# Patient Record
Sex: MALE | Race: WHITE | NOT HISPANIC OR LATINO | Employment: FULL TIME | URBAN - METROPOLITAN AREA
[De-identification: names, ages, dates, MRNs, and addresses within clinical notes are randomized per-mention and may not be internally consistent; named-entity substitution may affect disease eponyms.]

---

## 2017-06-19 ENCOUNTER — APPOINTMENT (OUTPATIENT)
Dept: RADIOLOGY | Facility: CLINIC | Age: 47
End: 2017-06-19
Payer: COMMERCIAL

## 2017-06-19 ENCOUNTER — ALLSCRIPTS OFFICE VISIT (OUTPATIENT)
Dept: OTHER | Facility: OTHER | Age: 47
End: 2017-06-19

## 2017-06-19 DIAGNOSIS — M25.512 PAIN IN LEFT SHOULDER: ICD-10-CM

## 2017-06-19 DIAGNOSIS — M75.82 OTHER SHOULDER LESIONS, LEFT SHOULDER: ICD-10-CM

## 2017-06-19 PROCEDURE — 73030 X-RAY EXAM OF SHOULDER: CPT

## 2017-07-17 ENCOUNTER — HOSPITAL ENCOUNTER (EMERGENCY)
Facility: HOSPITAL | Age: 47
Discharge: HOME | End: 2017-07-17
Attending: EMERGENCY MEDICINE | Admitting: EMERGENCY MEDICINE
Payer: COMMERCIAL

## 2017-07-17 VITALS
TEMPERATURE: 99.8 F | RESPIRATION RATE: 20 BRPM | HEART RATE: 88 BPM | HEIGHT: 74 IN | DIASTOLIC BLOOD PRESSURE: 80 MMHG | WEIGHT: 200 LBS | OXYGEN SATURATION: 97 % | BODY MASS INDEX: 25.67 KG/M2 | SYSTOLIC BLOOD PRESSURE: 134 MMHG

## 2017-07-17 DIAGNOSIS — D72.829 LEUKOCYTOSIS: ICD-10-CM

## 2017-07-17 DIAGNOSIS — D64.9 ANEMIA: ICD-10-CM

## 2017-07-17 DIAGNOSIS — M25.50 ARTHRALGIA: Primary | ICD-10-CM

## 2017-07-17 LAB
ANION GAP SERPL CALCULATED.3IONS-SCNC: 8 MMOL/L (ref 4–13)
BASOPHILS # BLD AUTO: 0 THOUSANDS/ΜL (ref 0–0.1)
BASOPHILS NFR BLD AUTO: 0 % (ref 0–1)
BUN SERPL-MCNC: 19 MG/DL (ref 5–25)
CALCIUM SERPL-MCNC: 9.2 MG/DL (ref 8.3–10.1)
CHLORIDE SERPL-SCNC: 98 MMOL/L (ref 100–108)
CK SERPL-CCNC: 113 U/L (ref 39–308)
CO2 SERPL-SCNC: 28 MMOL/L (ref 21–32)
CREAT SERPL-MCNC: 1.36 MG/DL (ref 0.6–1.3)
EOSINOPHIL # BLD AUTO: 1.3 THOUSAND/ΜL (ref 0–0.61)
EOSINOPHIL NFR BLD AUTO: 7 % (ref 0–6)
ERYTHROCYTE [DISTWIDTH] IN BLOOD BY AUTOMATED COUNT: 14.2 % (ref 11.6–15.1)
GFR SERPL CREATININE-BSD FRML MDRD: 56.7 ML/MIN/1.73SQ M
GLUCOSE SERPL-MCNC: 106 MG/DL (ref 65–140)
HCT VFR BLD AUTO: 34.5 % (ref 42–52)
HGB BLD-MCNC: 11.1 G/DL (ref 14–18)
LYMPHOCYTES # BLD AUTO: 1.5 THOUSANDS/ΜL (ref 0.6–4.47)
LYMPHOCYTES NFR BLD AUTO: 8 % (ref 14–44)
MCH RBC QN AUTO: 26.2 PG (ref 27–31)
MCHC RBC AUTO-ENTMCNC: 32.2 G/DL (ref 31.4–37.4)
MCV RBC AUTO: 82 FL (ref 82–98)
MONOCYTES # BLD AUTO: 1.6 THOUSAND/ΜL (ref 0.17–1.22)
MONOCYTES NFR BLD AUTO: 9 % (ref 4–12)
NEUTROPHILS # BLD AUTO: 13.6 THOUSANDS/ΜL (ref 1.85–7.62)
NEUTS SEG NFR BLD AUTO: 76 % (ref 43–75)
NRBC BLD AUTO-RTO: 0 /100 WBCS
PLATELET # BLD AUTO: 493 THOUSANDS/UL (ref 130–400)
PMV BLD AUTO: 6.4 FL (ref 8.9–12.7)
POTASSIUM SERPL-SCNC: 4.4 MMOL/L (ref 3.5–5.3)
RBC # BLD AUTO: 4.23 MILLION/UL (ref 4.7–6.1)
SODIUM SERPL-SCNC: 134 MMOL/L (ref 136–145)
WBC # BLD AUTO: 18 THOUSAND/UL (ref 4.8–10.8)

## 2017-07-17 PROCEDURE — 99283 EMERGENCY DEPT VISIT LOW MDM: CPT

## 2017-07-17 PROCEDURE — 82550 ASSAY OF CK (CPK): CPT | Performed by: EMERGENCY MEDICINE

## 2017-07-17 PROCEDURE — 85025 COMPLETE CBC W/AUTO DIFF WBC: CPT | Performed by: EMERGENCY MEDICINE

## 2017-07-17 PROCEDURE — 96361 HYDRATE IV INFUSION ADD-ON: CPT

## 2017-07-17 PROCEDURE — 80048 BASIC METABOLIC PNL TOTAL CA: CPT | Performed by: EMERGENCY MEDICINE

## 2017-07-17 PROCEDURE — 96374 THER/PROPH/DIAG INJ IV PUSH: CPT

## 2017-07-17 PROCEDURE — 36415 COLL VENOUS BLD VENIPUNCTURE: CPT | Performed by: EMERGENCY MEDICINE

## 2017-07-17 RX ORDER — IBUPROFEN 600 MG/1
600 TABLET ORAL EVERY 8 HOURS PRN
COMMUNITY

## 2017-07-17 RX ORDER — KETOROLAC TROMETHAMINE 30 MG/ML
15 INJECTION, SOLUTION INTRAMUSCULAR; INTRAVENOUS ONCE
Status: COMPLETED | OUTPATIENT
Start: 2017-07-17 | End: 2017-07-17

## 2017-07-17 RX ORDER — OXYCODONE HYDROCHLORIDE AND ACETAMINOPHEN 5; 325 MG/1; MG/1
1 TABLET ORAL ONCE
Status: COMPLETED | OUTPATIENT
Start: 2017-07-17 | End: 2017-07-17

## 2017-07-17 RX ORDER — OXYCODONE HYDROCHLORIDE AND ACETAMINOPHEN 5; 325 MG/1; MG/1
1 TABLET ORAL EVERY 6 HOURS PRN
Qty: 15 TABLET | Refills: 0 | Status: SHIPPED | OUTPATIENT
Start: 2017-07-17

## 2017-07-17 RX ADMIN — KETOROLAC TROMETHAMINE 15 MG: 30 INJECTION, SOLUTION INTRAMUSCULAR at 15:00

## 2017-07-17 RX ADMIN — SODIUM CHLORIDE 1000 ML: 0.9 INJECTION, SOLUTION INTRAVENOUS at 21:41

## 2017-07-17 RX ADMIN — OXYCODONE HYDROCHLORIDE AND ACETAMINOPHEN 1 TABLET: 5; 325 TABLET ORAL at 22:37

## 2017-08-01 ENCOUNTER — ALLSCRIPTS OFFICE VISIT (OUTPATIENT)
Dept: OTHER | Facility: OTHER | Age: 47
End: 2017-08-01

## 2017-08-02 ENCOUNTER — APPOINTMENT (OUTPATIENT)
Dept: PHYSICAL THERAPY | Facility: CLINIC | Age: 47
End: 2017-08-02
Payer: COMMERCIAL

## 2017-08-02 PROCEDURE — 97162 PT EVAL MOD COMPLEX 30 MIN: CPT

## 2017-08-02 PROCEDURE — 97140 MANUAL THERAPY 1/> REGIONS: CPT

## 2017-08-04 ENCOUNTER — GENERIC CONVERSION - ENCOUNTER (OUTPATIENT)
Dept: OTHER | Facility: OTHER | Age: 47
End: 2017-08-04

## 2017-08-09 ENCOUNTER — APPOINTMENT (OUTPATIENT)
Dept: PHYSICAL THERAPY | Facility: CLINIC | Age: 47
End: 2017-08-09
Payer: COMMERCIAL

## 2017-08-11 ENCOUNTER — APPOINTMENT (OUTPATIENT)
Dept: PHYSICAL THERAPY | Facility: CLINIC | Age: 47
End: 2017-08-11
Payer: COMMERCIAL

## 2017-08-15 ENCOUNTER — APPOINTMENT (OUTPATIENT)
Dept: PHYSICAL THERAPY | Facility: CLINIC | Age: 47
End: 2017-08-15
Payer: COMMERCIAL

## 2017-08-17 ENCOUNTER — APPOINTMENT (OUTPATIENT)
Dept: PHYSICAL THERAPY | Facility: CLINIC | Age: 47
End: 2017-08-17
Payer: COMMERCIAL

## 2017-08-23 ENCOUNTER — APPOINTMENT (OUTPATIENT)
Dept: PHYSICAL THERAPY | Facility: CLINIC | Age: 47
End: 2017-08-23
Payer: COMMERCIAL

## 2017-08-25 ENCOUNTER — APPOINTMENT (OUTPATIENT)
Dept: PHYSICAL THERAPY | Facility: CLINIC | Age: 47
End: 2017-08-25
Payer: COMMERCIAL

## 2017-08-29 ENCOUNTER — APPOINTMENT (OUTPATIENT)
Dept: PHYSICAL THERAPY | Facility: CLINIC | Age: 47
End: 2017-08-29
Payer: COMMERCIAL

## 2017-08-31 ENCOUNTER — APPOINTMENT (OUTPATIENT)
Dept: PHYSICAL THERAPY | Facility: CLINIC | Age: 47
End: 2017-08-31
Payer: COMMERCIAL

## 2018-01-12 VITALS — WEIGHT: 203.13 LBS | BODY MASS INDEX: 26.07 KG/M2 | HEIGHT: 74 IN

## 2018-01-13 VITALS
BODY MASS INDEX: 27 KG/M2 | SYSTOLIC BLOOD PRESSURE: 116 MMHG | HEIGHT: 74 IN | DIASTOLIC BLOOD PRESSURE: 76 MMHG | WEIGHT: 210.38 LBS | HEART RATE: 54 BPM

## 2019-08-14 ENCOUNTER — APPOINTMENT (OUTPATIENT)
Dept: RADIOLOGY | Facility: CLINIC | Age: 49
End: 2019-08-14
Payer: COMMERCIAL

## 2019-08-14 VITALS
WEIGHT: 200 LBS | DIASTOLIC BLOOD PRESSURE: 99 MMHG | SYSTOLIC BLOOD PRESSURE: 141 MMHG | HEIGHT: 74 IN | BODY MASS INDEX: 25.67 KG/M2 | HEART RATE: 73 BPM

## 2019-08-14 DIAGNOSIS — M76.32 IT BAND SYNDROME, LEFT: ICD-10-CM

## 2019-08-14 DIAGNOSIS — M25.552 PAIN IN LEFT HIP: ICD-10-CM

## 2019-08-14 DIAGNOSIS — M70.62 TROCHANTERIC BURSITIS OF LEFT HIP: Primary | ICD-10-CM

## 2019-08-14 PROCEDURE — 73502 X-RAY EXAM HIP UNI 2-3 VIEWS: CPT

## 2019-08-14 PROCEDURE — 99213 OFFICE O/P EST LOW 20 MIN: CPT | Performed by: ORTHOPAEDIC SURGERY

## 2019-08-14 RX ORDER — ALUMINUM ZIRCONIUM OCTACHLOROHYDREX GLY 16 G/100G
GEL TOPICAL
COMMUNITY

## 2019-08-14 NOTE — PROGRESS NOTES
Assessment/Plan:  1  Trochanteric bursitis of left hip  XR hip/pelv 2-3 vws left if performed    Ambulatory referral to Physical Therapy   2  It band syndrome, left  Ambulatory referral to Physical Therapy       Ayaan Pedroza has left-sided hip pain consistent with trochanteric bursitis and IT band syndrome  His x-ray does not show any significant abnormality in the hip or lateral to the greater trochanter  I would like for him to begin with conservative treatment of physical therapy for IT band stretching  He should back down on his running slightly as he undergoes treatment  He could try cross training with biking or swimming  I will see him back in 6 weeks for repeat evaluation we could consider cortisone injection  I asked him to check with his rheumatologist before we proceed with a cortisone injection to make sure this is okay since he has been on long-term steroids in the past     Subjective:   Tania Laguna is a 53 y o  male who presents to the office for evaluation for left-sided hip pain  He states he has been experiencing increased discomfort over the lateral aspect of his left hip for the past several months  He denies any specific injury or trauma  He does report a long history of recent medical issues dating back to the last 2 years  He has diagnosis of Wegener's granulomatosis recently which he attributes to some of his muscular aches  He also states that he is an avid runner and runs anywhere from 5-6 miles every other day  The pain will increase after running  He feels most discomfort over the lateral portion of the hip and occasionally travels down the leg  He denies any pain into his groin  He has tried resting and ice but he cannot take anti-inflammatories  He has been trying to take Tylenol occasionally  Review of Systems   Constitutional: Negative for chills, fever and unexpected weight change  HENT: Negative for hearing loss, nosebleeds and sore throat      Eyes: Negative for pain, redness and visual disturbance  Respiratory: Negative for cough, shortness of breath and wheezing  Cardiovascular: Negative for chest pain, palpitations and leg swelling  Gastrointestinal: Negative for abdominal pain, nausea and vomiting  Endocrine: Negative for polyphagia and polyuria  Genitourinary: Negative for dysuria and hematuria  Musculoskeletal:        See HPI   Skin: Negative for rash and wound  Neurological: Negative for dizziness, numbness and headaches  Psychiatric/Behavioral: Negative for decreased concentration and suicidal ideas  The patient is not nervous/anxious            Past Medical History:   Diagnosis Date    Head injury     Renal cancer, left ([address] ) 2018    Wegener's granulomatosis (granulomatosis with polyangiitis) (HCC)        Past Surgical History:   Procedure Laterality Date    NEPHRECTOMY  2018    partial        Family History   Problem Relation Age of Onset    Cancer Mother     No Known Problems Father     No Known Problems Sister     No Known Problems Brother     No Known Problems Maternal Aunt     No Known Problems Maternal Uncle     No Known Problems Paternal Aunt     No Known Problems Paternal Uncle     No Known Problems Maternal Grandmother     No Known Problems Maternal Grandfather     No Known Problems Paternal Grandmother     No Known Problems Paternal Grandfather        Social History     Occupational History    Not on file   Tobacco Use    Smoking status: Never Smoker    Smokeless tobacco: Never Used   Substance and Sexual Activity    Alcohol use: Yes     Comment: social     Drug use: No    Sexual activity: Not on file         Current Outpatient Medications:     bifidobacterium infantis (ALIGN) capsule, Take by mouth, Disp: , Rfl:     riTUXimab (RITUXAN) injection, Infuse into a venous catheter, Disp: , Rfl:     ibuprofen (MOTRIN) 600 mg tablet, Take 600 mg by mouth every 8 (eight) hours as needed for mild pain, Disp: , Rfl:    oxyCODONE-acetaminophen (PERCOCET) 5-325 mg per tablet, Take 1 tablet by mouth every 6 (six) hours as needed for moderate pain for up to 15 doses Max Daily Amount: 4 tablets (Patient not taking: Reported on 8/14/2019), Disp: 15 tablet, Rfl: 0    Allergies   Allergen Reactions    Bactrim [Sulfamethoxazole-Trimethoprim] Hives       Objective:  Vitals:    08/14/19 0924   BP: 141/99   Pulse: 73       Left Hip Exam     Tenderness   The patient is experiencing tenderness in the greater trochanter  Range of Motion   Abduction: normal   Adduction: normal   Extension: normal   Flexion: normal   External rotation: normal   Internal rotation: normal     Muscle Strength   Abduction: 5/5   Adduction: 5/5   Flexion: 5/5     Tests   AYAN: negative  Regina: positive    Other   Erythema: absent  Sensation: normal  Pulse: present            Physical Exam   Constitutional: He is oriented to person, place, and time  He appears well-developed and well-nourished  HENT:   Head: Normocephalic and atraumatic  Eyes: Pupils are equal, round, and reactive to light  Conjunctivae are normal    Neck: Normal range of motion  Neck supple  Cardiovascular: Normal rate and intact distal pulses  Pulmonary/Chest: Effort normal  No respiratory distress  Musculoskeletal:   As noted in HPI   Neurological: He is alert and oriented to person, place, and time  Skin: Skin is warm and dry  Psychiatric: He has a normal mood and affect  His behavior is normal    Vitals reviewed  I have personally reviewed pertinent films in PACS and my interpretation is as follows: Three-view x-ray of the left hip today demonstrates a small osseous density just superior to the acetabulum at the level of pincer lesion  No evidence of acute fracture

## 2019-08-21 ENCOUNTER — EVALUATION (OUTPATIENT)
Dept: PHYSICAL THERAPY | Facility: CLINIC | Age: 49
End: 2019-08-21
Payer: COMMERCIAL

## 2019-08-21 DIAGNOSIS — M70.62 TROCHANTERIC BURSITIS OF LEFT HIP: ICD-10-CM

## 2019-08-21 DIAGNOSIS — M76.32 IT BAND SYNDROME, LEFT: ICD-10-CM

## 2019-08-21 PROCEDURE — 97161 PT EVAL LOW COMPLEX 20 MIN: CPT

## 2019-08-21 NOTE — PROGRESS NOTES
PT Evaluation     Today's date: 2019  Patient name: Karishma Antunez  : 1970  MRN: [id number]  Referring provider: Erika Chavez DO  Dx:   Encounter Diagnosis     ICD-10-CM    1  Trochanteric bursitis of left hip M70 62 Ambulatory referral to Physical Therapy   2  It band syndrome, left M76 32 Ambulatory referral to Physical Therapy       Start Time: 1400  Stop Time: 1430  Total time in clinic (min): 30 minutes    Assessment  Assessment details: Karishma Antunez is a 53 y o  male who presents with pain, decreased strength, decreased ROM, decreased joint mobility and postural  dysfunction  Due to these impairments, patient has difficulty performing a/iadls, recreational activities and work-related activities  Patient's clinical presentation is consistent with their referring diagnosis of No diagnosis found    Patient has been educated in home exercise program and plan of care  Patient would benefit from skilled physical therapy services to address their aforementioned functional limitations and progress towards prior level of function and independence with home exercise program    Impairments: abnormal muscle firing, abnormal or restricted ROM, activity intolerance, impaired physical strength, lacks appropriate home exercise program, pain with function and poor posture   Understanding of Dx/Px/POC: good   Prognosis: good    Goals  Short Term Goals: Target Date 19  1  Pt will initiate and advance HEP  2  Pt will demonstrate increased HS length  3  Pt will demonstrate improved standing posture  4  Pt will report decreased pain frequency  Long Term Goals: Target Date 10/21/19  1  Pt will demonstrate independence in HEP  2  Pt will demonstrate symmetrical AROM in b/l hips  3  Pt will report return to running without pain  4  Pt will demonstrate improved squat mechanics         Plan  Patient would benefit from: skilled PT  Planned modality interventions: cryotherapy, electrical stimulation/[place]rainian stimulation and thermotherapy: hydrocollator packs  Planned therapy interventions: joint mobilization, manual therapy, patient education, postural training, activity modification, abdominal trunk stabilization, body mechanics training, flexibility, functional ROM exercises, graded exercise, home exercise program, neuromuscular re-education, strengthening, stretching, therapeutic activities, therapeutic exercise, motor coordination training, muscle pump exercises, gait training, balance/weight bearing training, ADL training and breathing training  Frequency: 2x week  Duration in weeks: 8  Treatment plan discussed with: patient        Subjective Evaluation    History of Present Illness  Date of onset: 2019  Mechanism of injury: Pt reports onset of L hip pain about a month and a half ago  Pt reports he attributes to him increasing mileage with running  Pt reports he had also just d/c taking pregnazone for 2 years secondary to diagnosis of Wegener's granulomatosis  Pt reports pain in L ship is constant and increases with running and stress  Pt sought Dr Gato Walden, radiographs (-) and was referred to [facility]  Not a recurrent problem   Quality of life: good    Pain  Current pain ratin  At best pain ratin  At worst pain ratin  Location: L lateral hip to L lateral knee  Quality: radiating  Relieving factors: rest and change in position  Aggravating factors: running, walking, standing and stair climbing  Progression: no change    Social Support    Employment status: working (poice officer)  Exercise history: running 3x/week, [employer], machines, body weight exercises      Diagnostic Tests  X-ray: normal  Patient Goals  Patient goals for therapy: return to sport/leisure activities and decreased pain          Objective     Static Posture     Comments  Pt stands with L lateral shift, FHP, b/l IR shoulders    Palpation   Left   Hypertonic in the lumbar paraspinals and quadratus lumborum     Tenderness of the gluteus medius, obturator externus, piriformis and TFL  Tenderness     Left Hip   Tenderness in the greater trochanter and sacroiliac joint  Lumbar Screen  Lumbar range of motion within normal limits with the following exceptions:L/S  Flexion decreased by 25%  Extension WFLs  Side Bending decreased by 25% R*pain   Rotaiton WFsLs *pain R     Neurological Testing     Sensation     Hip   Left Hip   Intact: light touch    Right Hip   Intact: light touch    Active Range of Motion   Left Hip   Flexion: 122 degrees   External rotation (90/90): 20 degrees   Internal rotation (90/90): 30 degrees     Right Hip   Flexion: 126 degrees   External rotation (90/90): 20 degrees   Internal rotation (90/90): 30 degrees     Strength/Myotome Testing     Left Hip   Planes of Motion   Flexion: 5  Extension: 4+  Abduction: 4+  External rotation: 4- (pain)  Internal rotation: 4    Right Hip   Normal muscle strength    Tests     Left Hip   Positive Ely's and scour  Negative AYAN    90/90 SLR: Positive  Right Hip   Negative AYAN    90/90 SLR: Positive       Functional Assessment        Single Leg Stance   Left: 30 seconds  Right: 30 seconds    Comments  Squat b/l knee valgus increased trunk flexion *pain              Precautions: standard, CA      Manual  8/21                                                                                 Exercise Diary              bike             Piriformis st 3x:30            HS stretch 3x:30            Hip flexor stretch 3x:30                                                                                                                                                                                                                                Modalities

## 2019-08-26 ENCOUNTER — OFFICE VISIT (OUTPATIENT)
Dept: PHYSICAL THERAPY | Facility: CLINIC | Age: 49
End: 2019-08-26
Payer: COMMERCIAL

## 2019-08-26 DIAGNOSIS — M76.32 IT BAND SYNDROME, LEFT: ICD-10-CM

## 2019-08-26 DIAGNOSIS — M70.62 TROCHANTERIC BURSITIS OF LEFT HIP: Primary | ICD-10-CM

## 2019-08-26 PROCEDURE — 97110 THERAPEUTIC EXERCISES: CPT

## 2019-08-26 PROCEDURE — 97140 MANUAL THERAPY 1/> REGIONS: CPT

## 2019-08-26 NOTE — PROGRESS NOTES
Daily Note     Today's date: 2019  Patient name: Ana Rodriguez  : 1970  MRN: [id number]  Referring provider: Yaritza Bennett DO  Dx:   Encounter Diagnosis     ICD-10-CM    1  Trochanteric bursitis of left hip M70 62    2  It band syndrome, left M76 32                   Subjective: Pt reports he performed stretches which helped discomfort, reports he continues to run, painful during and afterwards  Objective: See treatment diary below    Manual             IAS  L LE                                                                   Exercise Diary              bike             Piriformis st 3x:30 3x:30           HS stretch 3x:30 3x:30           Hip flexor stretch 3x:30 3x:30           Clamshells  20x           Hip IR  20x           Hip Abd  20x           Gastroc Stretch  1 min           Pallof Press  5x blue                                                                                                                                                              Modalities                                                         Assessment: Pt notes ttp along greater trochanter, demonstrates weakness on L with pallof press  Plan: Continue per plan of care  Progress treatment as tolerated         Precautions: standard, CA

## 2019-08-28 ENCOUNTER — OFFICE VISIT (OUTPATIENT)
Dept: PHYSICAL THERAPY | Facility: CLINIC | Age: 49
End: 2019-08-28
Payer: COMMERCIAL

## 2019-08-28 DIAGNOSIS — M70.62 TROCHANTERIC BURSITIS OF LEFT HIP: Primary | ICD-10-CM

## 2019-08-28 DIAGNOSIS — M76.32 IT BAND SYNDROME, LEFT: ICD-10-CM

## 2019-08-28 PROCEDURE — 97110 THERAPEUTIC EXERCISES: CPT

## 2019-08-28 PROCEDURE — 97140 MANUAL THERAPY 1/> REGIONS: CPT

## 2019-08-28 NOTE — PROGRESS NOTES
Daily Note     Today's date: 2019  Patient name: Eugenia Andrade  : 1970  MRN: [id number]  Referring provider: Agustin Lizarraga DO  Dx:   Encounter Diagnosis     ICD-10-CM    1  Trochanteric bursitis of left hip M70 62    2  It band syndrome, left M76 32        Start Time: 1345  Stop Time: 1430  Total time in clinic (min): 45 minutes    Subjective: Pt reports he tried cross training hip pain remains with stairs and elliptical, but less  Objective: See treatment diary below    Manual            IASTM  L LE L LE                                                                  Exercise Diary              bike             Piriformis st 3x:30 3x:30 3x:30          HS stretch 3x:30 3x:30 3x:30          Hip flexor stretch 3x:30 3x:30 3x:30          Clamshells  20x 30x          Hip IR  20x 30x          Hip Abd  20x 30x          Gastroc Stretch  1 min 1 min          Pallof Press  5x blue 5x                                                                                                                                                             Modalities                                                         Assessment: Pt reports overall less discomfort with pallof press, ttp remains along GT  Plan: Continue per plan of care  Progress treatment as tolerated         Precautions: standard, CA Yes

## 2019-09-04 ENCOUNTER — OFFICE VISIT (OUTPATIENT)
Dept: PHYSICAL THERAPY | Facility: CLINIC | Age: 49
End: 2019-09-04
Payer: COMMERCIAL

## 2019-09-04 DIAGNOSIS — M70.62 TROCHANTERIC BURSITIS OF LEFT HIP: Primary | ICD-10-CM

## 2019-09-04 DIAGNOSIS — M76.32 IT BAND SYNDROME, LEFT: ICD-10-CM

## 2019-09-04 PROCEDURE — 97110 THERAPEUTIC EXERCISES: CPT

## 2019-09-04 PROCEDURE — 97140 MANUAL THERAPY 1/> REGIONS: CPT

## 2019-09-04 NOTE — PROGRESS NOTES
Daily Note     Today's date: 2019  Patient name: Mayda Hawley  : 1970  MRN: [id number]  Referring provider: Max Regan DO  Dx:   Encounter Diagnosis     ICD-10-CM    1  Trochanteric bursitis of left hip M70 62    2  It band syndrome, left M76 32        Start Time: 1345  Stop Time: 1430  Total time in clinic (min): 45 minutes    Subjective: Pt reports he can tell L hip is improving, less frequent pain and more mobility  2/10 pain in L hip  Objective: See treatment diary below    Manual           IASTM  L LE L LE L LE                                                                 Exercise Diary              bike             Piriformis st 3x:30 3x:30 3x:30 3x:30         HS stretch 3x:30 3x:30 3x:30 3x:30         Hip flexor stretch 3x:30 3x:30 3x:30 3x:30         Clamshells  20x 30x          Hip IR  20x 30x          Hip Abd  20x 30x          Gastroc Stretch  1 min 1 min          Pallof Press  5x blue 5x 5x         Lateral Stepping    20x red         3 way hip    kieser 3 10x                                                                                                                                  Modalities                                                         Assessment: Pt reports less pain post manual, no change in HEP  Plan: Continue per plan of care  Progress treatment as tolerated         Precautions: standard, CA

## 2019-09-09 ENCOUNTER — APPOINTMENT (OUTPATIENT)
Dept: PHYSICAL THERAPY | Facility: CLINIC | Age: 49
End: 2019-09-09
Payer: COMMERCIAL

## 2019-09-09 NOTE — PROGRESS NOTES
Daily Note     Today's date: 2019  Patient name: Kirby Briggs  : 1970  MRN: [id number]  Referring provider: Selene Lindsey DO  Dx:   No diagnosis found  Subjective: Pt reports he can tell L hip is improving, less frequent pain and more mobility  2/10 pain in L hip  Objective: See treatment diary below    Manual           IASTM  L LE L LE L LE                                                                 Exercise Diary              bike             Piriformis st 3x:30 3x:30 3x:30 3x:30         HS stretch 3x:30 3x:30 3x:30 3x:30         Hip flexor stretch 3x:30 3x:30 3x:30 3x:30         Clamshells  20x 30x          Hip IR  20x 30x          Hip Abd  20x 30x          Gastroc Stretch  1 min 1 min          Pallof Press  5x blue 5x 5x         Lateral Stepping    20x red         3 way hip    kieser 3 10x                                                                                                                                  Modalities                                                         Assessment: Pt reports less pain post manual, no change in HEP  Plan: Continue per plan of care  Progress treatment as tolerated         Precautions: standard, CA

## 2019-09-11 ENCOUNTER — OFFICE VISIT (OUTPATIENT)
Dept: PHYSICAL THERAPY | Facility: CLINIC | Age: 49
End: 2019-09-11
Payer: COMMERCIAL

## 2019-09-11 DIAGNOSIS — M76.32 IT BAND SYNDROME, LEFT: ICD-10-CM

## 2019-09-11 DIAGNOSIS — M70.62 TROCHANTERIC BURSITIS OF LEFT HIP: Primary | ICD-10-CM

## 2019-09-11 PROCEDURE — 97110 THERAPEUTIC EXERCISES: CPT

## 2019-09-11 PROCEDURE — 97140 MANUAL THERAPY 1/> REGIONS: CPT

## 2019-09-11 NOTE — PROGRESS NOTES
Daily Note     Today's date: 2019  Patient name: Joanne Allen  : 1970  MRN: [id number]  Referring provider: Erasmo Pereira DO  Dx:   Encounter Diagnosis     ICD-10-CM    1  Trochanteric bursitis of left hip M70 62    2  It band syndrome, left M76 32        Start Time: 1145  Stop Time: 1230  Total time in clinic (min): 45 minutes    Subjective: Pt reports  He was on a search and rescue which required a long day of running provoking hip symptoms  Objective: See treatment diary below    Manual          IASTM  L LE L LE L LE L LE                                                                Exercise Diary              bike             Piriformis st 3x:30 3x:30 3x:30 3x:30 3x:30        HS stretch 3x:30 3x:30 3x:30 3x:30 3x:30        Hip flexor stretch 3x:30 3x:30 3x:30 3x:30 3x:30        Clamshells  20x 30x          Hip IR  20x 30x          Hip Abd  20x 30x          Gastroc Stretch  1 min 1 min          Pallof Press  5x blue 5x 5x         Lateral Stepping    20x red 30x red        3 way hip    kieser 3 10x airex 10x                                                                                                                                 Modalities                                                         Assessment: Pt reports  Decreased soreness post treatment session  Plan: Continue per plan of care  Progress treatment as tolerated         Precautions: standard, CA

## 2019-09-16 ENCOUNTER — OFFICE VISIT (OUTPATIENT)
Dept: PHYSICAL THERAPY | Facility: CLINIC | Age: 49
End: 2019-09-16
Payer: COMMERCIAL

## 2019-09-16 DIAGNOSIS — M76.32 IT BAND SYNDROME, LEFT: ICD-10-CM

## 2019-09-16 DIAGNOSIS — M70.62 TROCHANTERIC BURSITIS OF LEFT HIP: Primary | ICD-10-CM

## 2019-09-16 PROCEDURE — 97110 THERAPEUTIC EXERCISES: CPT

## 2019-09-16 PROCEDURE — 97140 MANUAL THERAPY 1/> REGIONS: CPT

## 2019-09-16 NOTE — PROGRESS NOTES
Daily Note     Today's date: 2019  Patient name: Macey Alvarado  : 1970  MRN: [id number]  Referring provider: Bryant Dill DO  Dx:   No diagnosis found  Subjective: Pt reports continued improvement in L hip, reports pain is still exacerbated with all out efforts with running, biking, and elliptical  Pt reports he no longer has pain with walking and driving  Objective: See treatment diary below    Manual         IASTM  L LE L LE L LE L LE L LE                                                               Exercise Diary              bike             Piriformis st 3x:30 3x:30 3x:30 3x:30 3x:30 3x:30       HS stretch 3x:30 3x:30 3x:30 3x:30 3x:30 3x:30       Hip flexor stretch 3x:30 3x:30 3x:30 3x:30 3x:30 3x:30       Clamshells  20x 30x          Hip IR  20x 30x          Hip Abd  20x 30x          Gastroc Stretch  1 min 1 min          Pallof Press  5x blue 5x 5x  5x kieser 5       Lateral Stepping    20x red 30x red 30x red       3 way hip    kieser 3 10x airex 10x        Bridges leg lifts      20x       Couch Stretch      1 min                                                                                                      Modalities                                                         Assessment: Pt reports challenged with there ex, reports compliance with HEP  Plan: Continue per plan of care  Progress treatment as tolerated         Precautions: standard, CA

## 2019-09-18 ENCOUNTER — OFFICE VISIT (OUTPATIENT)
Dept: PHYSICAL THERAPY | Facility: CLINIC | Age: 49
End: 2019-09-18
Payer: COMMERCIAL

## 2019-09-18 DIAGNOSIS — M70.62 TROCHANTERIC BURSITIS OF LEFT HIP: Primary | ICD-10-CM

## 2019-09-18 DIAGNOSIS — M76.32 IT BAND SYNDROME, LEFT: ICD-10-CM

## 2019-09-18 PROCEDURE — 97110 THERAPEUTIC EXERCISES: CPT

## 2019-09-18 PROCEDURE — 97140 MANUAL THERAPY 1/> REGIONS: CPT

## 2019-09-18 NOTE — PROGRESS NOTES
Daily Note     Today's date: 2019  Patient name: Emily Larsen  : 1970  MRN: [id number]  Referring provider: Howard Sommer DO  Dx:   Encounter Diagnosis     ICD-10-CM    1  Trochanteric bursitis of left hip M70 62    2  It band syndrome, left M76 32                   Subjective: Pt reports he believes therapy is working, reports L hip remains with running  Objective: See treatment diary below    Manual        IASTM  L LE L LE L LE L LE L LE L LE      MFR       R iliopsoas/piriformis                                                 Exercise Diary              bike             Piriformis st 3x:30 3x:30 3x:30 3x:30 3x:30 3x:30 3x:30      HS stretch 3x:30 3x:30 3x:30 3x:30 3x:30 3x:30 3x:30      Hip flexor stretch 3x:30 3x:30 3x:30 3x:30 3x:30 3x:30 3x:30      Clamshells  20x 30x          Hip IR  20x 30x          Hip Abd  20x 30x          Gastroc Stretch  1 min 1 min    1 min      Pallof Press  5x blue 5x 5x  5x kieser 5       Lateral Stepping    20x red 30x red 30x red Green and monster walks      3 way hip    kieser 3 10x airex 10x        Bridges leg lifts      20x       Couch Stretch      1 min 1 min                                                                                                     Modalities                                                         Assessment: Pt reports no pain post treatment session, reports significant improvement after manual work  Plan: Continue per plan of care  Progress treatment as tolerated         Precautions: standard, [place]

## 2019-09-23 ENCOUNTER — OFFICE VISIT (OUTPATIENT)
Dept: PHYSICAL THERAPY | Facility: CLINIC | Age: 49
End: 2019-09-23
Payer: COMMERCIAL

## 2019-09-23 DIAGNOSIS — M70.62 TROCHANTERIC BURSITIS OF LEFT HIP: Primary | ICD-10-CM

## 2019-09-23 DIAGNOSIS — M76.32 IT BAND SYNDROME, LEFT: ICD-10-CM

## 2019-09-23 PROCEDURE — 97140 MANUAL THERAPY 1/> REGIONS: CPT

## 2019-09-23 PROCEDURE — 97110 THERAPEUTIC EXERCISES: CPT

## 2019-09-23 NOTE — PROGRESS NOTES
PT Re-Evaluation     Today's date: 2019  Patient name: Sherie Harrison  : 1970  MRN: [id number]  Referring provider: Valeriano Holt DO  Dx:   Encounter Diagnosis     ICD-10-CM    1  Trochanteric bursitis of left hip M70 62    2  It band syndrome, left M76 32        Start Time: 1345  Stop Time: 1430  Total time in clinic (min): 45 minutes    Assessment  Assessment details: Sherie Harrison has received 8 visits of physical therapy and reports 50 % improvement since initial evaluation  Pt demonstrates improved pain, and reports improvement with ambulate, work and I/ADLs  Pt continues to demonstrate pain, decreased strength, decreased ROM and postural  dysfunction  Due to these remaining impairments, patient continues to have difficulty performing recreational activities, work-related activities and engaging in social activities  Patient would benefit from continued skilled physical therapy services to address their aforementioned functional limitations and and continue to progress towards prior level of function and independence with home exercise program    Impairments: abnormal muscle firing, abnormal or restricted ROM, activity intolerance, impaired physical strength, lacks appropriate home exercise program, pain with function and poor posture   Understanding of Dx/Px/POC: good   Prognosis: good    Goals  Short Term Goals: Target Date 19  1  Pt will initiate and advance HEP  (achieved)   2  Pt will demonstrate increased HS length  (achieved)   3  Pt will demonstrate improved standing posture  (progressing towards)   4  Pt will report decreased pain frequency  (achieved)       Long Term Goals: Target Date 10/31/19 (progressing towards)   1  Pt will demonstrate independence in HEP  2  Pt will demonstrate symmetrical AROM in b/l hips  3  Pt will report return to running without pain  4  Pt will demonstrate improved squat mechanics         Plan  Patient would benefit from: skilled PT  Planned modality interventions: cryotherapy, electrical stimulation/Russian stimulation and thermotherapy: hydrocollator packs  Planned therapy interventions: joint mobilization, manual therapy, patient education, postural training, activity modification, abdominal trunk stabilization, body mechanics training, flexibility, functional ROM exercises, graded exercise, home exercise program, neuromuscular re-education, strengthening, stretching, therapeutic activities, therapeutic exercise, motor coordination training, muscle pump exercises, gait training, balance/weight bearing training, ADL training and breathing training  Frequency: 2x week  Duration in weeks: 4  Treatment plan discussed with: patient        Subjective Evaluation    History of Present Illness  Date of onset: 2019  Mechanism of injury: Pt reports onset of L hip pain about a month and a half ago  Pt reports he attributes to him increasing mileage with running  Pt reports he had also just d/c taking pregnazone for 2 years secondary to diagnosis of Wegener's granulomatosis  Pt reports pain in L ship is constant and increases with running and stress  Pt sought Dr Thai Carter, radiographs (-) and was referred to [facility]PPT      19  Pt has received 8 sessions of physical therapy and reports greater than 50% improvement since initial evaluation  Pt reports he notes less frequent pain with ADLs/IADLs  Pt reports he has continued to exercise vigorously, however has decreased mileage with running  Pt reports pain is still be present with running, and prolonged time on his feet  Pt reports compliance with HEP             Not a recurrent problem   Quality of life: good    Pain  Current pain ratin  At best pain ratin  At worst pain ratin  Location: L lateral hip to L lateral knee  Quality: radiating  Relieving factors: rest and change in position  Aggravating factors: running, walking, standing and stair climbing  Progression: no change    Social Support    Employment status: working (poice officer)  Exercise history: running 3x/week, free weights, machines, body weight exercises      Diagnostic Tests  X-ray: normal  Patient Goals  Patient goals for therapy: return to sport/leisure activities and decreased pain (progressing towards)          Objective     Static Posture     Comments  Pt stands with L lateral shift, FHP, b/l IR shoulders    Palpation   Left   Hypertonic in the lumbar paraspinals and quadratus lumborum  Tenderness of the gluteus medius, obturator externus, piriformis and TFL  Tenderness     Left Hip   Tenderness in the greater trochanter and sacroiliac joint  Lumbar Screen  Lumbar range of motion within normal limits with the following exceptions:L/S  Flexion decreased by 25%  Extension WFLs  Side Bending decreased by 25%   Rotaiton WFsLs     Neurological Testing     Sensation     Hip   Left Hip   Intact: light touch    Right Hip   Intact: light touch    Active Range of Motion   Left Hip   Flexion: 122 degrees   External rotation (90/90): 20 degrees   Internal rotation (90/90): 30 degrees     Right Hip   Flexion: 126 degrees   External rotation (90/90): 20 degrees   Internal rotation (90/90): 30 degrees     Strength/Myotome Testing     Left Hip   Planes of Motion   Flexion: 5  Extension: 4+  Abduction: 4+  External rotation: 4- (pain)  Internal rotation: 4    Right Hip   Normal muscle strength    Tests     Left Hip   Positive Ely's and scour  Negative AYAN    90/90 SLR: Positive  Right Hip   Negative AYAN    90/90 SLR: Positive       Functional Assessment        Single Leg Stance   Left: 30 seconds  Right: 30 seconds    Comments  Squat b/l knee valgus increased trunk flexion, no pain reported      Flowsheet Rows      Most Recent Value   PT/OT G-Codes   Current Score  56   Projected Score  73             Precautions: standard, CA    Manual  8/21 8/26 8/28 9/4 9/11 9/16 9/18 9/23     IASTM  L LE L LE L LE L LE L LE L LE L LE     MFR       R iliopsoas/piriformis R iliopsoas/piriformis                                                Exercise Diary              bike             Piriformis st 3x:30 3x:30 3x:30 3x:30 3x:30 3x:30 3x:30 3x:30     HS stretch 3x:30 3x:30 3x:30 3x:30 3x:30 3x:30 3x:30 3x:30     Hip flexor stretch 3x:30 3x:30 3x:30 3x:30 3x:30 3x:30 3x:30 3x:30     Clamshells  20x 30x          Hip IR  20x 30x          Hip Abd  20x 30x          Gastroc Stretch  1 min 1 min    1 min 1 min     Pallof Press  5x blue 5x 5x  5x kieser 5       Lateral Stepping    20x red 30x red 30x red Green and monster walks      3 way hip    kieser 3 10x airex 10x        Bridges leg lifts      20x       Couch Stretch      1 min 1 min      Dynamic Warm Up        performed                                                                                       Modalities

## 2019-09-25 ENCOUNTER — OFFICE VISIT (OUTPATIENT)
Dept: PHYSICAL THERAPY | Facility: CLINIC | Age: 49
End: 2019-09-25
Payer: COMMERCIAL

## 2019-09-25 DIAGNOSIS — M76.32 IT BAND SYNDROME, LEFT: ICD-10-CM

## 2019-09-25 DIAGNOSIS — M70.62 TROCHANTERIC BURSITIS OF LEFT HIP: Primary | ICD-10-CM

## 2019-09-25 PROCEDURE — 97110 THERAPEUTIC EXERCISES: CPT

## 2019-09-25 PROCEDURE — 97140 MANUAL THERAPY 1/> REGIONS: CPT

## 2019-09-25 NOTE — PROGRESS NOTES
Daily Note     Today's date: 2019  Patient name: Sherie Harrison  : 1970  MRN: [id number]  Referring provider: Valeriano Holt DO  Dx:   Encounter Diagnosis     ICD-10-CM    1  Trochanteric bursitis of left hip M70 62    2  It band syndrome, left M76 32                   Subjective: Pt reports he continues to improve, feeling        Objective: See treatment diary below  Manual      IASTM  L LE L LE L LE L LE L LE L LE L LE L LE    MFR       R iliopsoas/piriformis R iliopsoas/piriformis R iliopsoas/piriformis                                               Exercise Diary              bike             Piriformis st 3x:30 3x:30 3x:30 3x:30 3x:30 3x:30 3x:30 3x:30 3x:30    HS stretch 3x:30 3x:30 3x:30 3x:30 3x:30 3x:30 3x:30 3x:30 3x:30    Hip flexor stretch 3x:30 3x:30 3x:30 3x:30 3x:30 3x:30 3x:30 3x:30 3x:30    Clamshells  20x 30x          Hip IR  20x 30x          Hip Abd  20x 30x          Gastroc Stretch  1 min 1 min    1 min 1 min 1 min    Pallof Press  5x blue 5x 5x  5x kieser 5   5x5    Lateral Stepping    20x red 30x red 30x red Green and monster walks      3 way hip    kieser 3 10x airex 10x        Bridges leg lifts      20x       Couch Stretch      1 min 1 min      Dynamic Warm Up        performed     Bird/Dog         20x    Firehydrants         20x                                                            Modalities                                                           Assessment: Pt continues to feel better post treatment session, 0/10 pain  Plan: Continue per plan of care  Progress treatment as tolerated         Precautions: standard, CA

## 2019-10-07 ENCOUNTER — OFFICE VISIT (OUTPATIENT)
Dept: PHYSICAL THERAPY | Facility: CLINIC | Age: 49
End: 2019-10-07
Payer: COMMERCIAL

## 2019-10-07 DIAGNOSIS — M70.62 TROCHANTERIC BURSITIS OF LEFT HIP: Primary | ICD-10-CM

## 2019-10-07 DIAGNOSIS — M76.32 IT BAND SYNDROME, LEFT: ICD-10-CM

## 2019-10-07 PROCEDURE — 97110 THERAPEUTIC EXERCISES: CPT

## 2019-10-07 PROCEDURE — 97140 MANUAL THERAPY 1/> REGIONS: CPT

## 2019-10-07 NOTE — PROGRESS NOTES
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  Objective: See treatment diary below  Manual  8/21 8/26 8/28 9/4 9/11 9/16 9/18 9/23 9/25 10/7   IASTM  L LE L LE L LE L LE L LE L LE L LE L LE L LE   MFR       R iliopsoas/piriformis R iliopsoas/piriformis R iliopsoas/piriformis R piriformis                                              Exercise Diary              bike             Piriformis st 3x:30 3x:30 3x:30 3x:30 3x:30 3x:30 3x:30 3x:30 3x:30 3x:30   HS stretch 3x:30 3x:30 3x:30 3x:30 3x:30 3x:30 3x:30 3x:30 3x:30 3x:30   Hip flexor stretch 3x:30 3x:30 3x:30 3x:30 3x:30 3x:30 3x:30 3x:30 3x:30 3x:30   Clamshells  20x 30x       30x blue   Hip IR  20x 30x          Hip Abd  20x 30x          Gastroc Stretch  1 min 1 min    1 min 1 min 1 min 1 min   Pallof Press  5x blue 5x 5x  5x kieser 5   5x5    Lateral Stepping    20x red 30x red 30x red Green and monster walks      3 way hip    kieser 3 10x airex 10x        Bridges leg lifts      20x       Couch Stretch      1 min 1 min      Dynamic Warm Up        performed     Bird/Dog         20x    Firehydrants         20x    SLS          4way airex                                              Modalities                                                           Assessment: Pt notes discomfort with SLS on airex with L LE, educated on posture with standing  Plan: Continue per plan of care  Progress treatment as tolerated         Precautions: standard, CA

## 2019-10-09 ENCOUNTER — OFFICE VISIT (OUTPATIENT)
Dept: PHYSICAL THERAPY | Facility: CLINIC | Age: 49
End: 2019-10-09
Payer: COMMERCIAL

## 2019-10-09 DIAGNOSIS — M70.62 TROCHANTERIC BURSITIS OF LEFT HIP: ICD-10-CM

## 2019-10-09 PROCEDURE — 97140 MANUAL THERAPY 1/> REGIONS: CPT

## 2019-10-09 PROCEDURE — 97110 THERAPEUTIC EXERCISES: CPT

## 2019-10-09 NOTE — PROGRESS NOTES
Daily Note     Today's date: 10/9/2019  Patient name: Cathryn Madera  : 1970  MRN: [id number]  Referring provider: Augusto Dempsey DO  Dx:   No diagnosis found  Start Time: 1350  Stop Time: 1440  Total time in clinic (min): 50 minutes    Subjective: Pt reports L hip has been good this week, no increased pain with working out, stretching continues to improve symptoms  Objective: See treatment diary below  Manual  10/9  8/28 9/4 9/11 9/16 9/18 9/23 9/25 10/7   IASTM L LE  L LE L LE L LE L LE L LE L LE L LE L LE   MFR R iliopsoas/piriformis      R iliopsoas/piriformis R iliopsoas/piriformis R iliopsoas/piriformis R piriformis                                              Exercise Diary              bike             Piriformis st 3x:30  3x:30 3x:30 3x:30 3x:30 3x:30 3x:30 3x:30 3x:30   HS stretch 3x:30  3x:30 3x:30 3x:30 3x:30 3x:30 3x:30 3x:30 3x:30   Hip flexor stretch 3x:30  3x:30 3x:30 3x:30 3x:30 3x:30 3x:30 3x:30 3x:30   Clamshells   30x       30x blue   Hip IR   30x          Hip Abd   30x          Gastroc Stretch   1 min    1 min 1 min 1 min 1 min   Pallof Press 4 5 kieser 10x  5x 5x  5x kieser 5   5x5    Lateral Stepping Blue 30x monster   20x red 30x red 30x red Green and monster walks      3 way hip    kieser 3 10x airex 10x        Bridges leg lifts      20x       Couch Stretch      1 min 1 min      Dynamic Warm Up        performed     Bird/Dog         20x    Firehydrants         20x    SLS          4way airex   Brdiges 30x                                          Modalities                                                           Assessment: Pt reports no pain post treatment session, reviewed HEP, added bridges  Plan: Continue per plan of care  Progress note during next visit  Progress treatment as tolerated         Precautions: standard, CA

## 2019-10-14 ENCOUNTER — OFFICE VISIT (OUTPATIENT)
Dept: PHYSICAL THERAPY | Facility: CLINIC | Age: 49
End: 2019-10-14
Payer: COMMERCIAL

## 2019-10-14 DIAGNOSIS — M76.32 IT BAND SYNDROME, LEFT: ICD-10-CM

## 2019-10-14 DIAGNOSIS — M70.62 TROCHANTERIC BURSITIS OF LEFT HIP: Primary | ICD-10-CM

## 2019-10-14 PROCEDURE — 97140 MANUAL THERAPY 1/> REGIONS: CPT

## 2019-10-14 PROCEDURE — 97110 THERAPEUTIC EXERCISES: CPT

## 2019-10-14 NOTE — PROGRESS NOTES
PT Re-Evaluation     Today's date: 10/14/2019  Patient name: Chico Mon  : 1970  MRN: [id number]  Referring provider: Teresita Schneider DO  Dx:   Encounter Diagnosis     ICD-10-CM    1  Trochanteric bursitis of left hip M70 62    2  It band syndrome, left M76 32        Start Time: 1350  Stop Time: 1440  Total time in clinic (min): 50 minutes    Assessment  Assessment details: Chico Mon has received 12 visits of physical therapy and reports 75-80 % improvement since initial evaluation  Pt demonstrates improved pain, and reports improvement with ambulate, work and I/ADLs  Pt has achieved all goals and will benefit from continued HEP to continue to progress function  Understanding of Dx/Px/POC: good   Prognosis: good    Goals  Short Term Goals: Target Date 19  1  Pt will initiate and advance HEP  (achieved)   2  Pt will demonstrate increased HS length  (achieved)   3  Pt will demonstrate improved standing posture  (progressing towards)   4  Pt will report decreased pain frequency  (achieved)       Long Term Goals: Target Date 10/31/19   1  Pt will demonstrate independence in HEP  (achieved)   2  Pt will demonstrate symmetrical AROM in b/l hips  (achieved)   3  Pt will report return to running without pain  (achieved)   4  Pt will demonstrate improved squat mechanics  (achieved)       Plan  Plan details: Discharged with progressive HEP   Planned modality interventions: thermotherapy: hydrocollator packs  Treatment plan discussed with: patient        Subjective Evaluation    History of Present Illness  Date of onset: 2019  Mechanism of injury: Pt reports onset of L hip pain about a month and a half ago  Pt reports he attributes to him increasing mileage with running  Pt reports he had also just d/c taking pregnazone for 2 years secondary to diagnosis of Wegener's granulomatosis  Pt reports pain in L ship is constant and increases with running and stress   Pt sought Dr Madeleine Chase, radiographs (-) and was referred to [facility]  10/14/19  Pt has received 12 sessions of physical therapy and reports 75-80% improvement since initial evaluation  Pt reports he has continued to exercise, however reports he now performs more cross training  Pt reports pain is still be present with long distance running, and prolonged time on his feet, however with postural cues that has improved  Pt reports compliance with HEP  Not a recurrent problem   Quality of life: good    Pain  Current pain ratin  At best pain ratin  At worst pain ratin  Location: L lateral hip   Quality: dull ache  Relieving factors: rest and change in position  Aggravating factors: running and standing  Progression: no change    Social Support    Employment status: not working (retired)  Exercise history: running 3x/week, [employer], machines, body weight exercises      Diagnostic Tests  X-ray: normal  Patient Goals  Patient goals for therapy: return to sport/leisure activities and decreased pain (achieved)          Objective     Static Posture     Comments  Pt stands with decreased L lateral shift, FHP, b/l IR shoulders    Palpation   Left   No palpable tenderness to the gluteus medius, obturator externus, piriformis and TFL  Hypertonic in the quadratus lumborum  Tenderness     Left Hip   No tenderness in the greater trochanter and sacroiliac joint       Lumbar Screen  Lumbar range of motion within normal limits with the following exceptions:L/S  Flexion WFLs  Extension WFLs  Side Bending WFLs  Rotaiton WFsLs     Neurological Testing     Sensation     Hip   Left Hip   Intact: light touch    Right Hip   Intact: light touch    Active Range of Motion   Left Hip   Flexion: 124 degrees   External rotation (90/90): 20 degrees   Internal rotation (90/90): 30 degrees     Right Hip   Flexion: 126 degrees   External rotation (90/90): 20 degrees   Internal rotation (90/90): 30 degrees     Strength/Myotome Testing     Left Hip   Planes of Motion Flexion: 5  Extension: 5  Abduction: 5  External rotation: 5 (pain)  Internal rotation: 5    Right Hip   Normal muscle strength    Tests     Left Hip   Negative Ely's, AYAN and audra  90/90 SLR: Negative  Right Hip   Negative AYAN    90/90 SLR: Negative      Functional Assessment        Single Leg Stance   Left: 30 seconds  Right: 30 seconds    Comments  Squat b/l knee valgus (corrected with verbal cue)  Improved with trunk flexion, no pain reported             Precautions: standard, [place]    Manual  10/9 10/14  9/4 9/11 9/16 9/18 9/23 9/25 10/7   IASTM L LE L LE  L LE L LE L LE L LE L LE L LE L LE   MFR R iliopsoas/piriformis R iliopsoas/piriformis     R iliopsoas/piriformis R iliopsoas/piriformis R iliopsoas/piriformis R piriformis                                              Exercise Diary              bike             Piriformis st 3x:30 3x:30  3x:30 3x:30 3x:30 3x:30 3x:30 3x:30 3x:30   HS stretch 3x:30 3x:30  3x:30 3x:30 3x:30 3x:30 3x:30 3x:30 3x:30   Hip flexor stretch 3x:30 3x:30  3x:30 3x:30 3x:30 3x:30 3x:30 3x:30 3x:30   Clamshells          30x blue   Hip IR             Hip Abd             Gastroc Stretch       1 min 1 min 1 min 1 min   Pallof Press 4 5 kieser 10x   5x  5x kieser 5   5x5    Lateral Stepping Blue 30x monster   20x red 30x red 30x red Green and monster walks      3 way hip    kieser 3 10x airex 10x        Bridges leg lifts      20x       Couch Stretch      1 min 1 min      Dynamic Warm Up        performed     Bird/Dog         20x    Firehydrants         20x    SLS  Step up bosu        4way airex   Brdiges 30x            Dead bug  20x           TVA sh ex march  20x               Modalities

## 2019-10-16 ENCOUNTER — APPOINTMENT (OUTPATIENT)
Dept: PHYSICAL THERAPY | Facility: CLINIC | Age: 49
End: 2019-10-16
Payer: COMMERCIAL

## 2019-10-21 ENCOUNTER — APPOINTMENT (OUTPATIENT)
Dept: PHYSICAL THERAPY | Facility: CLINIC | Age: 49
End: 2019-10-21
Payer: COMMERCIAL

## 2019-10-23 ENCOUNTER — APPOINTMENT (OUTPATIENT)
Dept: PHYSICAL THERAPY | Facility: CLINIC | Age: 49
End: 2019-10-23
Payer: COMMERCIAL

## 2019-10-28 ENCOUNTER — APPOINTMENT (OUTPATIENT)
Dept: PHYSICAL THERAPY | Facility: CLINIC | Age: 49
End: 2019-10-28
Payer: COMMERCIAL

## 2019-10-30 ENCOUNTER — APPOINTMENT (OUTPATIENT)
Dept: PHYSICAL THERAPY | Facility: CLINIC | Age: 49
End: 2019-10-30
Payer: COMMERCIAL